# Patient Record
Sex: FEMALE | Race: WHITE | URBAN - NONMETROPOLITAN AREA
[De-identification: names, ages, dates, MRNs, and addresses within clinical notes are randomized per-mention and may not be internally consistent; named-entity substitution may affect disease eponyms.]

---

## 2022-01-20 ENCOUNTER — LAB OUTSIDE AN ENCOUNTER (OUTPATIENT)
Dept: URBAN - NONMETROPOLITAN AREA CLINIC 2 | Facility: CLINIC | Age: 52
End: 2022-01-20

## 2022-01-20 ENCOUNTER — WEB ENCOUNTER (OUTPATIENT)
Dept: URBAN - NONMETROPOLITAN AREA CLINIC 2 | Facility: CLINIC | Age: 52
End: 2022-01-20

## 2022-01-20 ENCOUNTER — OFFICE VISIT (OUTPATIENT)
Dept: URBAN - NONMETROPOLITAN AREA CLINIC 2 | Facility: CLINIC | Age: 52
End: 2022-01-20
Payer: COMMERCIAL

## 2022-01-20 VITALS
TEMPERATURE: 97.6 F | SYSTOLIC BLOOD PRESSURE: 152 MMHG | HEIGHT: 64 IN | DIASTOLIC BLOOD PRESSURE: 84 MMHG | WEIGHT: 228 LBS | HEART RATE: 88 BPM | BODY MASS INDEX: 38.93 KG/M2

## 2022-01-20 DIAGNOSIS — K59.01 SLOW TRANSIT CONSTIPATION: ICD-10-CM

## 2022-01-20 DIAGNOSIS — Z12.11 COLON CANCER SCREENING: ICD-10-CM

## 2022-01-20 DIAGNOSIS — K62.5 RECTAL BLEEDING: ICD-10-CM

## 2022-01-20 DIAGNOSIS — R10.31 RLQ ABDOMINAL PAIN: ICD-10-CM

## 2022-01-20 PROCEDURE — 99204 OFFICE O/P NEW MOD 45 MIN: CPT | Performed by: INTERNAL MEDICINE

## 2022-01-20 PROCEDURE — 99244 OFF/OP CNSLTJ NEW/EST MOD 40: CPT | Performed by: INTERNAL MEDICINE

## 2022-01-20 RX ORDER — ESOMEPRAZOLE MAGNESIUM 20 MG/1
1 PACKET MIXED WITH 15 ML OF WATER CAPSULE, DELAYED RELEASE ORAL ONCE A DAY
Status: ACTIVE | COMMUNITY

## 2022-01-20 RX ORDER — SODIUM PICOSULFATE, MAGNESIUM OXIDE, AND ANHYDROUS CITRIC ACID 10; 3.5; 12 MG/160ML; G/160ML; G/160ML
160 ML LIQUID ORAL
Qty: 320 MILLILITER | Refills: 0 | OUTPATIENT
Start: 2022-01-20 | End: 2022-01-21

## 2022-01-20 RX ORDER — SALICYLIC ACID 3 G/100G
1 TABLET SWALLOW WHOLE WITH WATER; DO NOT TAKE WITH FRUIT JUICES LOTION TOPICAL ONCE A DAY
Status: ACTIVE | COMMUNITY

## 2022-01-20 RX ORDER — SERTRALINE HYDROCHLORIDE 25 MG/1
1 TABLET TABLET, FILM COATED ORAL ONCE A DAY
Status: ACTIVE | COMMUNITY

## 2022-01-20 RX ORDER — ESZOPICLONE 2 MG/1
1 TABLET IMMEDIATELY BEFORE BEDTIME TABLET, COATED ORAL ONCE A DAY
Status: ACTIVE | COMMUNITY

## 2022-01-20 RX ORDER — LEVOTHYROXINE SODIUM 75 UG/1
1 TABLET IN THE MORNING ON AN EMPTY STOMACH TABLET ORAL ONCE A DAY
Status: ACTIVE | COMMUNITY

## 2022-01-20 NOTE — HPI-TODAY'S VISIT:
Nasra is a 51-year-old female referred to me by Dr. Rob Mims for consultation of colorectal cancer screening, constipation, rectal bleeding.  She states that she has had issues with constipation and incomplete evacuation for years.  She does not take anything regularly for her bowel movements.  She does have bright red blood per rectum at times with her hemorrhoids which she states she has had since childbirth.  In December she had significant anibal bleeding.  She states that time she has a bowel movement with every urination and at times she has to strain.  She recently was straining and had developed what felt like a right lower quadrant hernia.  She has never had a colonoscopy.  Her mother has a history of colon polyps, her maternal grandfather has a history of colon cancer.  Today we have discussed risk and benefit of colonoscopy, she agrees to pursue.  She agrees to start low-dose fiber and check labs.  MB

## 2022-01-22 LAB
A/G RATIO: 1.3
ALBUMIN: 3.5
ALKALINE PHOSPHATASE: 65
ALT (SGPT): 40
AST (SGOT): 31
BASO (ABSOLUTE): 0.1
BASOS: 1
BILIRUBIN, TOTAL: <0.2
BUN/CREATININE RATIO: 12
BUN: 11
C-REACTIVE PROTEIN, QUANT: 7
CALCIUM: 8.9
CARBON DIOXIDE, TOTAL: 22
CHLORIDE: 103
CREATININE: 0.92
DEAMIDATED GLIADIN ABS, IGA: 3
DEAMIDATED GLIADIN ABS, IGG: 2
EGFR IF AFRICN AM: 85
EGFR IF NONAFRICN AM: 73
ENDOMYSIAL ANTIBODY IGA: NEGATIVE
EOS (ABSOLUTE): 0.3
EOS: 3
GLOBULIN, TOTAL: 2.7
GLUCOSE: 86
HEMATOCRIT: 43.6
HEMATOLOGY COMMENTS:: (no result)
HEMOGLOBIN: 13.9
IMMATURE CELLS: (no result)
IMMATURE GRANS (ABS): 0.1
IMMATURE GRANULOCYTES: 1
IMMUNOGLOBULIN A, QN, SERUM: 143
LYMPHS (ABSOLUTE): 2.8
LYMPHS: 27
MCH: 28.4
MCHC: 31.9
MCV: 89
MONOCYTES(ABSOLUTE): 0.6
MONOCYTES: 6
NEUTROPHILS (ABSOLUTE): 6.7
NEUTROPHILS: 62
NRBC: (no result)
PLATELETS: 396
POTASSIUM: 4.1
PROTEIN, TOTAL: 6.2
RBC: 4.9
RDW: 12.3
SEDIMENTATION RATE-WESTERGREN: 27
SODIUM: 140
T-TRANSGLUTAMINASE (TTG) IGA: <2
T-TRANSGLUTAMINASE (TTG) IGG: <2
T4,FREE(DIRECT): 1.45
TSH: 0.77
WBC: 10.6

## 2022-01-24 ENCOUNTER — OFFICE VISIT (OUTPATIENT)
Dept: URBAN - NONMETROPOLITAN AREA SURGERY CENTER 1 | Facility: SURGERY CENTER | Age: 52
End: 2022-01-24
Payer: COMMERCIAL

## 2022-01-24 ENCOUNTER — CLAIMS CREATED FROM THE CLAIM WINDOW (OUTPATIENT)
Dept: URBAN - METROPOLITAN AREA CLINIC 4 | Facility: CLINIC | Age: 52
End: 2022-01-24
Payer: COMMERCIAL

## 2022-01-24 DIAGNOSIS — D12.2 BENIGN NEOPLASM OF ASCENDING COLON: ICD-10-CM

## 2022-01-24 DIAGNOSIS — D12.0 ADENOMA OF CECUM: ICD-10-CM

## 2022-01-24 DIAGNOSIS — K59.09 CHANGE IN BOWEL MOVEMENTS INTERMITTENT CONSTIPATION. URGENCY IN THE MORNING.: ICD-10-CM

## 2022-01-24 DIAGNOSIS — D12.2 ADENOMA OF ASCENDING COLON: ICD-10-CM

## 2022-01-24 DIAGNOSIS — D12.0 BENIGN NEOPLASM OF CECUM: ICD-10-CM

## 2022-01-24 DIAGNOSIS — R10.32 ABDOMINAL CRAMPING IN LEFT LOWER QUADRANT: ICD-10-CM

## 2022-01-24 PROCEDURE — G8907 PT DOC NO EVENTS ON DISCHARG: HCPCS | Performed by: INTERNAL MEDICINE

## 2022-01-24 PROCEDURE — 88305 TISSUE EXAM BY PATHOLOGIST: CPT | Performed by: PATHOLOGY

## 2022-01-24 PROCEDURE — 45385 COLONOSCOPY W/LESION REMOVAL: CPT | Performed by: INTERNAL MEDICINE

## 2022-01-25 ENCOUNTER — LAB OUTSIDE AN ENCOUNTER (OUTPATIENT)
Dept: URBAN - METROPOLITAN AREA CLINIC 92 | Facility: CLINIC | Age: 52
End: 2022-01-25

## 2022-01-25 ENCOUNTER — TELEPHONE ENCOUNTER (OUTPATIENT)
Dept: URBAN - METROPOLITAN AREA CLINIC 92 | Facility: CLINIC | Age: 52
End: 2022-01-25

## 2022-02-09 ENCOUNTER — TELEPHONE ENCOUNTER (OUTPATIENT)
Dept: URBAN - METROPOLITAN AREA CLINIC 92 | Facility: CLINIC | Age: 52
End: 2022-02-09

## 2022-02-21 LAB
A/G RATIO: 1.5
ACTIN (SMOOTH MUSCLE) ANTIBODY: 26
ALBUMIN: 3.6
ALKALINE PHOSPHATASE: 64
ALT (SGPT): 43
ANA DIRECT: NEGATIVE
AST (SGOT): 36
BASO (ABSOLUTE): 0.1
BASOS: 1
BILIRUBIN, TOTAL: 0.3
BUN/CREATININE RATIO: 13
BUN: 12
CALCIUM: 8.9
CARBON DIOXIDE, TOTAL: 23
CHLORIDE: 102
CREATININE: 0.92
EGFR IF AFRICN AM: 85
EGFR IF NONAFRICN AM: 73
EOS (ABSOLUTE): 0.3
EOS: 4
FERRITIN, SERUM: 54
GGT: 32
GLOBULIN, TOTAL: 2.4
GLUCOSE: 72
HBSAG SCREEN: NEGATIVE
HCV AB: <0.1
HEMATOCRIT: 42.9
HEMATOLOGY COMMENTS:: (no result)
HEMOGLOBIN: 13.6
HEP A AB, IGM: NEGATIVE
HEP B CORE AB, IGM: NEGATIVE
IMMATURE CELLS: (no result)
IMMATURE GRANS (ABS): 0.1
IMMATURE GRANULOCYTES: 1
INR: 0.9
INTERPRETATION:: (no result)
IRON BIND.CAP.(TIBC): 380
IRON SATURATION: 35
IRON: 134
LYMPHS (ABSOLUTE): 2.5
LYMPHS: 30
MCH: 28.8
MCHC: 31.7
MCV: 91
MITOCHONDRIAL (M2) ANTIBODY: <20
MONOCYTES(ABSOLUTE): 0.6
MONOCYTES: 7
NEUTROPHILS (ABSOLUTE): 4.8
NEUTROPHILS: 57
NRBC: (no result)
PLATELETS: 370
POTASSIUM: 4.4
PROTEIN, TOTAL: 6
PROTHROMBIN TIME: 9.7
RBC: 4.73
RDW: 12.6
SODIUM: 141
UIBC: 246
WBC: 8.4

## 2022-02-22 ENCOUNTER — TELEPHONE ENCOUNTER (OUTPATIENT)
Dept: URBAN - METROPOLITAN AREA CLINIC 92 | Facility: CLINIC | Age: 52
End: 2022-02-22

## 2022-02-28 ENCOUNTER — OFFICE VISIT (OUTPATIENT)
Dept: URBAN - METROPOLITAN AREA TELEHEALTH 2 | Facility: TELEHEALTH | Age: 52
End: 2022-02-28
Payer: COMMERCIAL

## 2022-02-28 DIAGNOSIS — R79.89 ELEVATED LFTS: ICD-10-CM

## 2022-02-28 DIAGNOSIS — K76.0 FATTY (CHANGE OF) LIVER: ICD-10-CM

## 2022-02-28 PROCEDURE — 97802 MEDICAL NUTRITION INDIV IN: CPT | Performed by: DIETITIAN, REGISTERED

## 2022-02-28 RX ORDER — ESOMEPRAZOLE MAGNESIUM 20 MG/1
1 PACKET MIXED WITH 15 ML OF WATER CAPSULE, DELAYED RELEASE ORAL ONCE A DAY
Status: ACTIVE | COMMUNITY

## 2022-02-28 RX ORDER — LEVOTHYROXINE SODIUM 75 UG/1
1 TABLET IN THE MORNING ON AN EMPTY STOMACH TABLET ORAL ONCE A DAY
Status: ACTIVE | COMMUNITY

## 2022-02-28 RX ORDER — SERTRALINE HYDROCHLORIDE 25 MG/1
1 TABLET TABLET, FILM COATED ORAL ONCE A DAY
Status: ACTIVE | COMMUNITY

## 2022-02-28 RX ORDER — SALICYLIC ACID 3 G/100G
1 TABLET SWALLOW WHOLE WITH WATER; DO NOT TAKE WITH FRUIT JUICES LOTION TOPICAL ONCE A DAY
Status: ACTIVE | COMMUNITY

## 2022-02-28 RX ORDER — ESZOPICLONE 2 MG/1
1 TABLET IMMEDIATELY BEFORE BEDTIME TABLET, COATED ORAL ONCE A DAY
Status: ACTIVE | COMMUNITY

## 2022-02-28 NOTE — HPI-TODAY'S VISIT:
Nutrition initial visit for fatty liver.  She reports that her weight fluctuates a lot.  She does not drink alcohol at all and never has.  Both of her parents had chronic health problems.  Her mother passed away last year due to complications from obesity and type 2 diabetes.  Patient is doing intermittent fasting.  2pm smoothie she makes at home.  She feels that if she eats intuitively she is craving healthier foods Eats dinner 6-7pm.

## 2022-04-26 ENCOUNTER — WEB ENCOUNTER (OUTPATIENT)
Dept: URBAN - NONMETROPOLITAN AREA CLINIC 2 | Facility: CLINIC | Age: 52
End: 2022-04-26

## 2022-04-26 ENCOUNTER — LAB OUTSIDE AN ENCOUNTER (OUTPATIENT)
Dept: URBAN - NONMETROPOLITAN AREA CLINIC 2 | Facility: CLINIC | Age: 52
End: 2022-04-26

## 2022-04-26 ENCOUNTER — OFFICE VISIT (OUTPATIENT)
Dept: URBAN - NONMETROPOLITAN AREA CLINIC 2 | Facility: CLINIC | Age: 52
End: 2022-04-26
Payer: COMMERCIAL

## 2022-04-26 VITALS
DIASTOLIC BLOOD PRESSURE: 82 MMHG | BODY MASS INDEX: 38.93 KG/M2 | TEMPERATURE: 97.5 F | SYSTOLIC BLOOD PRESSURE: 135 MMHG | WEIGHT: 228 LBS | HEIGHT: 64 IN | HEART RATE: 65 BPM

## 2022-04-26 DIAGNOSIS — R74.8 ELEVATED LIVER ENZYMES: ICD-10-CM

## 2022-04-26 DIAGNOSIS — Z83.71 FAMILY HISTORY OF COLONIC POLYPS: ICD-10-CM

## 2022-04-26 DIAGNOSIS — K59.01 SLOW TRANSIT CONSTIPATION: ICD-10-CM

## 2022-04-26 DIAGNOSIS — K62.5 RECTAL BLEEDING: ICD-10-CM

## 2022-04-26 DIAGNOSIS — R10.31 RLQ ABDOMINAL PAIN: ICD-10-CM

## 2022-04-26 DIAGNOSIS — Z80.0 FAMILY HISTORY OF COLON CANCER: ICD-10-CM

## 2022-04-26 DIAGNOSIS — K21.9 GASTROESOPHAGEAL REFLUX DISEASE, UNSPECIFIED WHETHER ESOPHAGITIS PRESENT: ICD-10-CM

## 2022-04-26 DIAGNOSIS — Z12.11 COLON CANCER SCREENING: ICD-10-CM

## 2022-04-26 PROCEDURE — 99214 OFFICE O/P EST MOD 30 MIN: CPT | Performed by: NURSE PRACTITIONER

## 2022-04-26 RX ORDER — LEVOTHYROXINE SODIUM 75 UG/1
1 TABLET IN THE MORNING ON AN EMPTY STOMACH TABLET ORAL ONCE A DAY
Status: ACTIVE | COMMUNITY

## 2022-04-26 RX ORDER — ESZOPICLONE 2 MG/1
1 TABLET IMMEDIATELY BEFORE BEDTIME TABLET, COATED ORAL ONCE A DAY
Status: ACTIVE | COMMUNITY

## 2022-04-26 RX ORDER — ESOMEPRAZOLE MAGNESIUM 40 MG/1
1 CAPSULE CAPSULE, DELAYED RELEASE ORAL ONCE A DAY
Qty: 90 CAPSULE | Refills: 3 | OUTPATIENT
Start: 2022-04-26

## 2022-04-26 RX ORDER — SALICYLIC ACID 3 G/100G
1 TABLET SWALLOW WHOLE WITH WATER; DO NOT TAKE WITH FRUIT JUICES LOTION TOPICAL ONCE A DAY
Status: ACTIVE | COMMUNITY

## 2022-04-26 RX ORDER — SERTRALINE HYDROCHLORIDE 25 MG/1
1 TABLET TABLET, FILM COATED ORAL ONCE A DAY
Status: ACTIVE | COMMUNITY

## 2022-04-26 RX ORDER — ESOMEPRAZOLE MAGNESIUM 20 MG/1
1 PACKET MIXED WITH 15 ML OF WATER CAPSULE, DELAYED RELEASE ORAL ONCE A DAY
Status: ACTIVE | COMMUNITY

## 2022-04-26 NOTE — HPI-TODAY'S VISIT:
Nasra is a 51-year-old female referred to me by Dr. Rob Mims for consultation of colorectal cancer screening, constipation, rectal bleeding.  She states that she has had issues with constipation and incomplete evacuation for years.  She does not take anything regularly for her bowel movements.  She does have bright red blood per rectum at times with her hemorrhoids which she states she has had since childbirth.  In December she had significant anibal bleeding.  She states that time she has a bowel movement with every urination and at times she has to strain.  She recently was straining and had developed what felt like a right lower quadrant hernia.  She has never had a colonoscopy.  Her mother has a history of colon polyps, her maternal grandfather has a history of colon cancer.  Today we have discussed risk and benefit of colonoscopy, she agrees to pursue.  She agrees to start low-dose fiber and check labs.  MB 4/26/2022 Nasra presents for follow-up of elevated liver enzymes, fatty liver, constipation and abdominal pain.  Since her last visit she started low-dose fiber.  Her bowels are more complete but she still has incomplete evacuation.  She does agree to start low-dose MiraLAX.  Her colonoscopy was normal other than 3 tubular adenomas, she will be due for repeat in 3 years.  She has been on Nexium to 20 mg capsules for years with reflux.  She is trying to wean to once daily but is usually unsuccessful after several days.  She is never had an upper endoscopy.  She denies any dysphagia.  She feels best on 40 mg of PPI daily.  Her ALT is still mildly elevated.  Her smooth muscle antibody is also weak positive.  She is due for repeat labs today with an IgG.  Her ultrasound shows fatty liver and mild  hepatomegaly.  Today we had a long discussion regarding weight loss.  She is struggled with this throughout her life.  We have discussed calorie counting, she would like to give this a try.  Today she is doing well otherwise with no new GI complaints.  She does agree to pursue EGD given her chronic reflux symptoms and inability to wean her PPI.  MB

## 2022-04-30 LAB
A/G RATIO: 1.5
ALBUMIN: 3.9
ALKALINE PHOSPHATASE: 69
ALT (SGPT): 31
AST (SGOT): 28
BASO (ABSOLUTE): 0.1
BASOS: 1
BILIRUBIN, TOTAL: 0.2
BUN/CREATININE RATIO: 16
BUN: 13
CALCIUM: 9.1
CARBON DIOXIDE, TOTAL: 23
CHLORIDE: 105
CREATININE: 0.8
EGFR: 90
EOS (ABSOLUTE): 0.3
EOS: 4
GLOBULIN, TOTAL: 2.6
GLUCOSE: 86
HEMATOCRIT: 43.1
HEMATOLOGY COMMENTS:: (no result)
HEMOGLOBIN: 13.7
IGG, IMMUNOGLOBULIN G (RDL): 924
IMMATURE CELLS: (no result)
IMMATURE GRANS (ABS): 0
IMMATURE GRANULOCYTES: 0
LYMPHS (ABSOLUTE): 2.3
LYMPHS: 31
MCH: 28.8
MCHC: 31.8
MCV: 91
MONOCYTES(ABSOLUTE): 0.5
MONOCYTES: 6
NEUTROPHILS (ABSOLUTE): 4.3
NEUTROPHILS: 58
NRBC: (no result)
PLATELETS: 386
POTASSIUM: 4.5
PROTEIN, TOTAL: 6.5
RBC: 4.75
RDW: 11.9
SODIUM: 140
WBC: 7.4

## 2022-05-26 ENCOUNTER — TELEPHONE ENCOUNTER (OUTPATIENT)
Dept: URBAN - NONMETROPOLITAN AREA CLINIC 13 | Facility: CLINIC | Age: 52
End: 2022-05-26

## 2022-05-26 RX ORDER — ESOMEPRAZOLE MAGNESIUM 40 MG/1
1 CAPSULE CAPSULE, DELAYED RELEASE ORAL ONCE A DAY
Qty: 90 CAPSULE | Refills: 3
Start: 2022-04-26

## 2022-06-21 ENCOUNTER — OFFICE VISIT (OUTPATIENT)
Dept: URBAN - NONMETROPOLITAN AREA SURGERY CENTER 1 | Facility: SURGERY CENTER | Age: 52
End: 2022-06-21
Payer: COMMERCIAL

## 2022-06-21 ENCOUNTER — CLAIMS CREATED FROM THE CLAIM WINDOW (OUTPATIENT)
Dept: URBAN - METROPOLITAN AREA CLINIC 4 | Facility: CLINIC | Age: 52
End: 2022-06-21
Payer: COMMERCIAL

## 2022-06-21 DIAGNOSIS — K21.9 ACID REFLUX: ICD-10-CM

## 2022-06-21 DIAGNOSIS — K29.70 GASTRITIS, UNSPECIFIED, WITHOUT BLEEDING: ICD-10-CM

## 2022-06-21 DIAGNOSIS — K31.89 ACQUIRED DEFORMITY OF DUODENUM: ICD-10-CM

## 2022-06-21 DIAGNOSIS — K31.89 OTHER DISEASES OF STOMACH AND DUODENUM: ICD-10-CM

## 2022-06-21 DIAGNOSIS — K22.2 ACQUIRED ESOPHAGEAL RING: ICD-10-CM

## 2022-06-21 DIAGNOSIS — K21.9 GASTRO-ESOPHAGEAL REFLUX DISEASE WITHOUT ESOPHAGITIS: ICD-10-CM

## 2022-06-21 PROCEDURE — 43249 ESOPH EGD DILATION <30 MM: CPT | Performed by: INTERNAL MEDICINE

## 2022-06-21 PROCEDURE — 43239 EGD BIOPSY SINGLE/MULTIPLE: CPT | Performed by: INTERNAL MEDICINE

## 2022-06-21 PROCEDURE — 88312 SPECIAL STAINS GROUP 1: CPT | Performed by: PATHOLOGY

## 2022-06-21 PROCEDURE — G8907 PT DOC NO EVENTS ON DISCHARG: HCPCS | Performed by: INTERNAL MEDICINE

## 2022-06-21 PROCEDURE — 88305 TISSUE EXAM BY PATHOLOGIST: CPT | Performed by: PATHOLOGY

## 2022-07-20 ENCOUNTER — OFFICE VISIT (OUTPATIENT)
Dept: URBAN - METROPOLITAN AREA TELEHEALTH 2 | Facility: TELEHEALTH | Age: 52
End: 2022-07-20
Payer: COMMERCIAL

## 2022-07-20 DIAGNOSIS — Z83.71 FAMILY HISTORY OF COLONIC POLYPS: ICD-10-CM

## 2022-07-20 DIAGNOSIS — R10.31 RLQ ABDOMINAL PAIN: ICD-10-CM

## 2022-07-20 DIAGNOSIS — K59.01 SLOW TRANSIT CONSTIPATION: ICD-10-CM

## 2022-07-20 DIAGNOSIS — R74.8 ELEVATED LIVER ENZYMES: ICD-10-CM

## 2022-07-20 DIAGNOSIS — Z12.11 COLON CANCER SCREENING: ICD-10-CM

## 2022-07-20 DIAGNOSIS — K21.9 GASTROESOPHAGEAL REFLUX DISEASE, UNSPECIFIED WHETHER ESOPHAGITIS PRESENT: ICD-10-CM

## 2022-07-20 DIAGNOSIS — K62.5 RECTAL BLEEDING: ICD-10-CM

## 2022-07-20 DIAGNOSIS — Z80.0 FAMILY HISTORY OF COLON CANCER: ICD-10-CM

## 2022-07-20 PROCEDURE — 99214 OFFICE O/P EST MOD 30 MIN: CPT | Performed by: INTERNAL MEDICINE

## 2022-07-20 RX ORDER — ESZOPICLONE 2 MG/1
1 TABLET IMMEDIATELY BEFORE BEDTIME TABLET, COATED ORAL ONCE A DAY
Status: ACTIVE | COMMUNITY

## 2022-07-20 RX ORDER — SERTRALINE HYDROCHLORIDE 25 MG/1
1 TABLET TABLET, FILM COATED ORAL ONCE A DAY
Status: ACTIVE | COMMUNITY

## 2022-07-20 RX ORDER — ESOMEPRAZOLE MAGNESIUM 20 MG/1
1 PACKET MIXED WITH 15 ML OF WATER CAPSULE, DELAYED RELEASE ORAL ONCE A DAY
Status: ACTIVE | COMMUNITY

## 2022-07-20 RX ORDER — ESOMEPRAZOLE MAGNESIUM 40 MG/1
1 CAPSULE CAPSULE, DELAYED RELEASE ORAL ONCE A DAY
Qty: 90 CAPSULE | Refills: 3 | Status: ACTIVE | COMMUNITY
Start: 2022-04-26

## 2022-07-20 RX ORDER — LEVOTHYROXINE SODIUM 75 UG/1
1 TABLET IN THE MORNING ON AN EMPTY STOMACH TABLET ORAL ONCE A DAY
Status: ACTIVE | COMMUNITY

## 2022-07-20 RX ORDER — SALICYLIC ACID 3 G/100G
1 TABLET SWALLOW WHOLE WITH WATER; DO NOT TAKE WITH FRUIT JUICES LOTION TOPICAL ONCE A DAY
Status: ACTIVE | COMMUNITY

## 2022-07-20 NOTE — HPI-TODAY'S VISIT:
Nasra is a 51-year-old female referred to me by Dr. Rob Mims for consultation of colorectal cancer screening, constipation, rectal bleeding.  She states that she has had issues with constipation and incomplete evacuation for years.  She does not take anything regularly for her bowel movements.  She does have bright red blood per rectum at times with her hemorrhoids which she states she has had since childbirth.  In December she had significant anibal bleeding.  She states that time she has a bowel movement with every urination and at times she has to strain.  She recently was straining and had developed what felt like a right lower quadrant hernia.  She has never had a colonoscopy.  Her mother has a history of colon polyps, her maternal grandfather has a history of colon cancer.  Today we have discussed risk and benefit of colonoscopy, she agrees to pursue.  She agrees to start low-dose fiber and check labs.  MB 4/26/2022 Nasra presents for follow-up of elevated liver enzymes, fatty liver, constipation and abdominal pain.  Since her last visit she started low-dose fiber.  Her bowels are more complete but she still has incomplete evacuation.  She does agree to start low-dose MiraLAX.  Her colonoscopy was normal other than 3 tubular adenomas, she will be due for repeat in 3 years.  She has been on Nexium to 20 mg capsules for years with reflux.  She is trying to wean to once daily but is usually unsuccessful after several days.  She is never had an upper endoscopy.  She denies any dysphagia.  She feels best on 40 mg of PPI daily.  Her ALT is still mildly elevated.  Her smooth muscle antibody is also weak positive.  She is due for repeat labs today with an IgG.  Her ultrasound shows fatty liver and mild  hepatomegaly.  Today we had a long discussion regarding weight loss.  She is struggled with this throughout her life.  We have discussed calorie counting, she would like to give this a try.  Today she is doing well otherwise with no new GI complaints.  She does agree to pursue EGD given her chronic reflux symptoms and inability to wean her PPI.  MB  7/20/2022 Nasra presents for follow up of fatty liver and constipation. Since her last visit she has increased her water intake substantially. She has noticed increased constipation. She is taking miralax and metamucil. She states when she changed her diet she noticed increased constipation with alternating diarrhea. She took away metamucil her stools are very sticky. When she adds in the miralax regularly she has runny stools. She has not tried just fiber itself. She has been intentional with eating whole foods and drinking lots of water. Her ALT is down to 40 from 43 in february. Her Cholesterol is significantly elevated. She is working on her diet but she suspects she will need a statin. Her EGD reveals mild reflux and gastritis. She is doing well on nexium 40mg daily. She wants to keep working on weight loss and then discus weaning at f/u visit. MB  This telehealth visit was provided at the patient's home.

## 2023-01-12 LAB
A/G RATIO: 1.2
ABSOLUTE BASOPHILS: 80
ABSOLUTE EOSINOPHILS: 320
ABSOLUTE LYMPHOCYTES: 2810
ABSOLUTE MONOCYTES: 670
ABSOLUTE NEUTROPHILS: 6120
ALBUMIN: 3.3
ALKALINE PHOSPHATASE: 64
ALT (SGPT): 27
AST (SGOT): 25
BASOPHILS: 0.8
BILIRUBIN, TOTAL: 0.3
BUN/CREATININE RATIO: (no result)
BUN: 12
CALCIUM: 8.8
CARBON DIOXIDE, TOTAL: 28
CHLORIDE: 107
CREATININE: 0.91
EGFR: 77
EOSINOPHILS: 3.2
GLOBULIN, TOTAL: 2.7
GLUCOSE: 75
HEMATOCRIT: 42
HEMOGLOBIN: 13.7
LYMPHOCYTES: 28.1
MCH: 28.6
MCHC: 32.6
MCV: 87.7
MONOCYTES: 6.7
MPV: 9.8
NEUTROPHILS: 61.2
PLATELET COUNT: 366
POTASSIUM: 4.7
PROTEIN, TOTAL: 6
RDW: 12.3
RED BLOOD CELL COUNT: 4.79
SODIUM: 141
WHITE BLOOD CELL COUNT: 10

## 2023-01-19 ENCOUNTER — OFFICE VISIT (OUTPATIENT)
Dept: URBAN - NONMETROPOLITAN AREA CLINIC 2 | Facility: CLINIC | Age: 53
End: 2023-01-19
Payer: COMMERCIAL

## 2023-01-19 ENCOUNTER — LAB OUTSIDE AN ENCOUNTER (OUTPATIENT)
Dept: URBAN - NONMETROPOLITAN AREA CLINIC 2 | Facility: CLINIC | Age: 53
End: 2023-01-19

## 2023-01-19 VITALS
BODY MASS INDEX: 37.9 KG/M2 | DIASTOLIC BLOOD PRESSURE: 85 MMHG | TEMPERATURE: 97.5 F | HEART RATE: 98 BPM | WEIGHT: 222 LBS | HEIGHT: 64 IN | SYSTOLIC BLOOD PRESSURE: 150 MMHG

## 2023-01-19 DIAGNOSIS — K59.01 SLOW TRANSIT CONSTIPATION: ICD-10-CM

## 2023-01-19 DIAGNOSIS — Z12.11 COLON CANCER SCREENING: ICD-10-CM

## 2023-01-19 DIAGNOSIS — R74.8 ELEVATED LIVER ENZYMES: ICD-10-CM

## 2023-01-19 DIAGNOSIS — R10.31 RLQ ABDOMINAL PAIN: ICD-10-CM

## 2023-01-19 DIAGNOSIS — K21.9 GASTROESOPHAGEAL REFLUX DISEASE, UNSPECIFIED WHETHER ESOPHAGITIS PRESENT: ICD-10-CM

## 2023-01-19 DIAGNOSIS — Z80.0 FAMILY HISTORY OF COLON CANCER: ICD-10-CM

## 2023-01-19 DIAGNOSIS — K62.5 RECTAL BLEEDING: ICD-10-CM

## 2023-01-19 DIAGNOSIS — Z83.71 FAMILY HISTORY OF COLONIC POLYPS: ICD-10-CM

## 2023-01-19 PROBLEM — 312824007: Status: ACTIVE | Noted: 2022-01-20

## 2023-01-19 PROBLEM — 305058001: Status: ACTIVE | Noted: 2022-01-20

## 2023-01-19 PROBLEM — 12063002: Status: ACTIVE | Noted: 2022-01-20

## 2023-01-19 PROBLEM — 301754002: Status: ACTIVE | Noted: 2022-01-20

## 2023-01-19 PROBLEM — 429969003: Status: ACTIVE | Noted: 2022-01-20

## 2023-01-19 PROCEDURE — 99214 OFFICE O/P EST MOD 30 MIN: CPT | Performed by: NURSE PRACTITIONER

## 2023-01-19 RX ORDER — ESOMEPRAZOLE MAGNESIUM 40 MG/1
1 CAPSULE CAPSULE, DELAYED RELEASE ORAL ONCE A DAY
Qty: 90 CAPSULE | Refills: 3 | Status: ACTIVE | COMMUNITY
Start: 2022-04-26

## 2023-01-19 RX ORDER — SERTRALINE HYDROCHLORIDE 25 MG/1
1 TABLET TABLET, FILM COATED ORAL ONCE A DAY
Status: ACTIVE | COMMUNITY

## 2023-01-19 RX ORDER — LEVOTHYROXINE SODIUM 75 UG/1
1 TABLET IN THE MORNING ON AN EMPTY STOMACH TABLET ORAL ONCE A DAY
Status: ACTIVE | COMMUNITY

## 2023-01-19 RX ORDER — SALICYLIC ACID 3 G/100G
1 TABLET SWALLOW WHOLE WITH WATER; DO NOT TAKE WITH FRUIT JUICES LOTION TOPICAL ONCE A DAY
Status: ACTIVE | COMMUNITY

## 2023-01-19 RX ORDER — ESZOPICLONE 2 MG/1
1 TABLET IMMEDIATELY BEFORE BEDTIME TABLET, COATED ORAL ONCE A DAY
Status: ACTIVE | COMMUNITY

## 2023-01-19 RX ORDER — ESOMEPRAZOLE MAGNESIUM 20 MG/1
1 PACKET MIXED WITH 15 ML OF WATER CAPSULE, DELAYED RELEASE ORAL ONCE A DAY
Status: ACTIVE | COMMUNITY

## 2023-01-19 NOTE — HPI-TODAY'S VISIT:
Nasra is a 51-year-old female referred to me by Dr. Rob Mims for consultation of colorectal cancer screening, constipation, rectal bleeding.  She states that she has had issues with constipation and incomplete evacuation for years.  She does not take anything regularly for her bowel movements.  She does have bright red blood per rectum at times with her hemorrhoids which she states she has had since childbirth.  In December she had significant anibal bleeding.  She states that time she has a bowel movement with every urination and at times she has to strain.  She recently was straining and had developed what felt like a right lower quadrant hernia.  She has never had a colonoscopy.  Her mother has a history of colon polyps, her maternal grandfather has a history of colon cancer.  Today we have discussed risk and benefit of colonoscopy, she agrees to pursue.  She agrees to start low-dose fiber and check labs.  MB 4/26/2022 Nasra presents for follow-up of elevated liver enzymes, fatty liver, constipation and abdominal pain.  Since her last visit she started low-dose fiber.  Her bowels are more complete but she still has incomplete evacuation.  She does agree to start low-dose MiraLAX.  Her colonoscopy was normal other than 3 tubular adenomas, she will be due for repeat in 3 years.  She has been on Nexium to 20 mg capsules for years with reflux.  She is trying to wean to once daily but is usually unsuccessful after several days.  She is never had an upper endoscopy.  She denies any dysphagia.  She feels best on 40 mg of PPI daily.  Her ALT is still mildly elevated.  Her smooth muscle antibody is also weak positive.  She is due for repeat labs today with an IgG.  Her ultrasound shows fatty liver and mild  hepatomegaly.  Today we had a long discussion regarding weight loss.  She is struggled with this throughout her life.  We have discussed calorie counting, she would like to give this a try.  Today she is doing well otherwise with no new GI complaints.  She does agree to pursue EGD given her chronic reflux symptoms and inability to wean her PPI.  MB  7/20/2022 Nasra presents for follow up of fatty liver and constipation. Since her last visit she has increased her water intake substantially. She has noticed increased constipation. She is taking miralax and metamucil. She states when she changed her diet she noticed increased constipation with alternating diarrhea. She took away metamucil her stools are very sticky. When she adds in the miralax regularly she has runny stools. She has not tried just fiber itself. She has been intentional with eating whole foods and drinking lots of water. Her ALT is down to 40 from 43 in february. Her Cholesterol is significantly elevated. She is working on her diet but she suspects she will need a statin. Her EGD reveals mild reflux and gastritis. She is doing well on nexium 40mg daily. She wants to keep working on weight loss and then discus weaning at f/u visit. MB  This telehealth visit was provided at the patient's home. 1/19/2023 Nasra presents for follow-up of reflux and fatty liver.  Since her last visit she is doing well and is omeprazole 40 mg daily.  She does not want to wean at this time.  She is working on weight loss and down 8 pounds.  She is reading a book called the ENT diet and this is helping her.  She is trying to move her body more and consume less calories overall without counting.  Today she denies any new GI complaints.  MB

## 2023-01-20 ENCOUNTER — LAB OUTSIDE AN ENCOUNTER (OUTPATIENT)
Dept: URBAN - METROPOLITAN AREA TELEHEALTH 2 | Facility: TELEHEALTH | Age: 53
End: 2023-01-20

## 2023-02-14 ENCOUNTER — TELEPHONE ENCOUNTER (OUTPATIENT)
Dept: URBAN - METROPOLITAN AREA CLINIC 92 | Facility: CLINIC | Age: 53
End: 2023-02-14

## 2023-06-06 ENCOUNTER — TELEPHONE ENCOUNTER (OUTPATIENT)
Dept: URBAN - NONMETROPOLITAN AREA CLINIC 2 | Facility: CLINIC | Age: 53
End: 2023-06-06

## 2023-06-06 RX ORDER — ESOMEPRAZOLE MAGNESIUM 40 MG/1
1 CAPSULE CAPSULE, DELAYED RELEASE ORAL ONCE A DAY
Qty: 90 CAPSULE | Refills: 3
Start: 2022-04-26

## 2023-07-19 ENCOUNTER — LAB OUTSIDE AN ENCOUNTER (OUTPATIENT)
Dept: URBAN - NONMETROPOLITAN AREA CLINIC 2 | Facility: CLINIC | Age: 53
End: 2023-07-19

## 2023-07-20 ENCOUNTER — OFFICE VISIT (OUTPATIENT)
Dept: URBAN - NONMETROPOLITAN AREA CLINIC 2 | Facility: CLINIC | Age: 53
End: 2023-07-20
Payer: COMMERCIAL

## 2023-07-20 ENCOUNTER — LAB OUTSIDE AN ENCOUNTER (OUTPATIENT)
Dept: URBAN - NONMETROPOLITAN AREA CLINIC 2 | Facility: CLINIC | Age: 53
End: 2023-07-20

## 2023-07-20 VITALS
DIASTOLIC BLOOD PRESSURE: 80 MMHG | SYSTOLIC BLOOD PRESSURE: 125 MMHG | BODY MASS INDEX: 40.63 KG/M2 | TEMPERATURE: 98.6 F | HEART RATE: 82 BPM | HEIGHT: 64 IN | WEIGHT: 238 LBS

## 2023-07-20 DIAGNOSIS — Z12.11 COLON CANCER SCREENING: ICD-10-CM

## 2023-07-20 DIAGNOSIS — K62.5 RECTAL BLEEDING: ICD-10-CM

## 2023-07-20 DIAGNOSIS — R74.8 ELEVATED LIVER ENZYMES: ICD-10-CM

## 2023-07-20 DIAGNOSIS — Z83.71 FAMILY HISTORY OF COLONIC POLYPS: ICD-10-CM

## 2023-07-20 DIAGNOSIS — Z80.0 FAMILY HISTORY OF COLON CANCER: ICD-10-CM

## 2023-07-20 DIAGNOSIS — K58.2 IRRITABLE BOWEL SYNDROME WITH BOTH CONSTIPATION AND DIARRHEA: ICD-10-CM

## 2023-07-20 DIAGNOSIS — R10.31 RLQ ABDOMINAL PAIN: ICD-10-CM

## 2023-07-20 DIAGNOSIS — K21.9 GASTROESOPHAGEAL REFLUX DISEASE, UNSPECIFIED WHETHER ESOPHAGITIS PRESENT: ICD-10-CM

## 2023-07-20 PROCEDURE — 99214 OFFICE O/P EST MOD 30 MIN: CPT | Performed by: NURSE PRACTITIONER

## 2023-07-20 RX ORDER — ESZOPICLONE 2 MG/1
1 TABLET IMMEDIATELY BEFORE BEDTIME TABLET, COATED ORAL ONCE A DAY
Status: ACTIVE | COMMUNITY

## 2023-07-20 RX ORDER — ESOMEPRAZOLE MAGNESIUM 20 MG/1
1 PACKET MIXED WITH 15 ML OF WATER CAPSULE, DELAYED RELEASE ORAL ONCE A DAY
Status: ACTIVE | COMMUNITY

## 2023-07-20 RX ORDER — LEVOTHYROXINE SODIUM 75 UG/1
1 TABLET IN THE MORNING ON AN EMPTY STOMACH TABLET ORAL ONCE A DAY
Status: ACTIVE | COMMUNITY

## 2023-07-20 RX ORDER — SERTRALINE HYDROCHLORIDE 25 MG/1
1 TABLET TABLET, FILM COATED ORAL ONCE A DAY
Status: ON HOLD | COMMUNITY

## 2023-07-20 RX ORDER — ESOMEPRAZOLE MAGNESIUM 40 MG/1
1 CAPSULE CAPSULE, DELAYED RELEASE ORAL ONCE A DAY
Qty: 90 CAPSULE | Refills: 3 | Status: ACTIVE | COMMUNITY
Start: 2022-04-26

## 2023-07-20 RX ORDER — RIFAXIMIN 550 MG/1
1 TABLET TABLET ORAL THREE TIMES A DAY
Qty: 42 TABLET | Refills: 1 | OUTPATIENT
Start: 2023-07-20 | End: 2023-08-17

## 2023-07-20 RX ORDER — SALICYLIC ACID 3 G/100G
1 TABLET SWALLOW WHOLE WITH WATER; DO NOT TAKE WITH FRUIT JUICES LOTION TOPICAL ONCE A DAY
Status: ACTIVE | COMMUNITY

## 2023-07-20 NOTE — HPI-TODAY'S VISIT:
Nasra is a 51-year-old female referred to me by Dr. Rob Mims for consultation of colorectal cancer screening, constipation, rectal bleeding.  She states that she has had issues with constipation and incomplete evacuation for years.  She does not take anything regularly for her bowel movements.  She does have bright red blood per rectum at times with her hemorrhoids which she states she has had since childbirth.  In December she had significant anibal bleeding.  She states that time she has a bowel movement with every urination and at times she has to strain.  She recently was straining and had developed what felt like a right lower quadrant hernia.  She has never had a colonoscopy.  Her mother has a history of colon polyps, her maternal grandfather has a history of colon cancer.  Today we have discussed risk and benefit of colonoscopy, she agrees to pursue.  She agrees to start low-dose fiber and check labs.  MB 4/26/2022 Nasra presents for follow-up of elevated liver enzymes, fatty liver, constipation and abdominal pain.  Since her last visit she started low-dose fiber.  Her bowels are more complete but she still has incomplete evacuation.  She does agree to start low-dose MiraLAX.  Her colonoscopy was normal other than 3 tubular adenomas, she will be due for repeat in 3 years.  She has been on Nexium to 20 mg capsules for years with reflux.  She is trying to wean to once daily but is usually unsuccessful after several days.  She is never had an upper endoscopy.  She denies any dysphagia.  She feels best on 40 mg of PPI daily.  Her ALT is still mildly elevated.  Her smooth muscle antibody is also weak positive.  She is due for repeat labs today with an IgG.  Her ultrasound shows fatty liver and mild  hepatomegaly.  Today we had a long discussion regarding weight loss.  She is struggled with this throughout her life.  We have discussed calorie counting, she would like to give this a try.  Today she is doing well otherwise with no new GI complaints.  She does agree to pursue EGD given her chronic reflux symptoms and inability to wean her PPI.  MB  7/20/2022 Nasra presents for follow up of fatty liver and constipation. Since her last visit she has increased her water intake substantially. She has noticed increased constipation. She is taking miralax and metamucil. She states when she changed her diet she noticed increased constipation with alternating diarrhea. She took away metamucil her stools are very sticky. When she adds in the miralax regularly she has runny stools. She has not tried just fiber itself. She has been intentional with eating whole foods and drinking lots of water. Her ALT is down to 40 from 43 in february. Her Cholesterol is significantly elevated. She is working on her diet but she suspects she will need a statin. Her EGD reveals mild reflux and gastritis. She is doing well on nexium 40mg daily. She wants to keep working on weight loss and then discus weaning at f/u visit. MB  This telehealth visit was provided at the patient's home. 1/19/2023 Nasra presents for follow-up of reflux and fatty liver.  Since her last visit she is doing well and is omeprazole 40 mg daily.  She does not want to wean at this time.  She is working on weight loss and down 8 pounds.  She is reading a book called the ENT diet and this is helping her.  She is trying to move her body more and consume less calories overall without counting.  Today she denies any new GI complaints.  MB 7/20/2023 Nasra presents for follow-up of reflux, fatty liver, and irritable bowel syndrome.  Since her last visit she has had a back injury and has not been able to work out as often.  She has gained a large amount of weight quickly almost 20 pounds.  She has had increased loose urgent bowel movements.  She denies any rectal bleeding or nocturnal symptoms.  Her right lower quadrant abdominal pain has improved.  She does have a history of fatty liver.  Today her main complaint is change in bowel habits.  Her colonoscopy in 2022 reveals 3 TA's.  We will check labs and schedule for stool studies.  If this is negative proceed with a course of Xifaxan for IBS-D.  She does ask about semaglutide products for weight loss, I recommended she discuss that with her primary care physician.  Her reflux is stable on Nexium 40 mg daily, we will hold off on weaning given her weight gain.  She is due for repeat labs, I suspect these will be mildly elevated with weight gain.  MB

## 2023-07-21 ENCOUNTER — TELEPHONE ENCOUNTER (OUTPATIENT)
Dept: URBAN - NONMETROPOLITAN AREA CLINIC 13 | Facility: CLINIC | Age: 53
End: 2023-07-21

## 2023-07-21 LAB
A/G RATIO: 1.2
ABSOLUTE BASOPHILS: 71
ABSOLUTE EOSINOPHILS: 383
ABSOLUTE LYMPHOCYTES: 2973
ABSOLUTE MONOCYTES: 472
ABSOLUTE NEUTROPHILS: 5002
ALBUMIN: 3.4
ALKALINE PHOSPHATASE: 75
ALT (SGPT): 37
AST (SGOT): 29
BASOPHILS: 0.8
BILIRUBIN, TOTAL: 0.2
BUN/CREATININE RATIO: (no result)
BUN: 14
C-REACTIVE PROTEIN, QUANT: 5.8
CALCIUM: 8.7
CARBON DIOXIDE, TOTAL: 26
CHLORIDE: 106
CREATININE: 0.88
EGFR: 80
EOSINOPHILS: 4.3
GLOBULIN, TOTAL: 2.8
GLUCOSE: 83
HEMATOCRIT: 41.7
HEMOGLOBIN: 13.7
LYMPHOCYTES: 33.4
MCH: 28.6
MCHC: 32.9
MCV: 87.1
MONOCYTES: 5.3
MPV: 9.3
NEUTROPHILS: 56.2
PLATELET COUNT: 372
POTASSIUM: 4.2
PROTEIN, TOTAL: 6.2
RDW: 12.8
RED BLOOD CELL COUNT: 4.79
SED RATE BY MODIFIED: 9
SODIUM: 140
TSH: 3.71
WHITE BLOOD CELL COUNT: 8.9

## 2023-07-26 ENCOUNTER — TELEPHONE ENCOUNTER (OUTPATIENT)
Dept: URBAN - NONMETROPOLITAN AREA CLINIC 2 | Facility: CLINIC | Age: 53
End: 2023-07-26

## 2023-07-27 ENCOUNTER — TELEPHONE ENCOUNTER (OUTPATIENT)
Dept: URBAN - NONMETROPOLITAN AREA CLINIC 13 | Facility: CLINIC | Age: 53
End: 2023-07-27

## 2023-08-01 LAB
CAMPYLOBACTER SPP. AG,EIA: (no result)
CLOSTRIDIUM DIFFICILE: (no result)
GIARDIA AG, EIA, STOOL: (no result)
LEUKOCYTES: (no result)
OVA AND PARASITES, CONC AND PERM SMEAR: (no result)
SALMONELLA AND SHIGELLA, CULTURE: (no result)
SHIGA TOXINS, EIA W/RFL TO E.COLI O157 CULTURE: (no result)

## 2023-08-02 ENCOUNTER — TELEPHONE ENCOUNTER (OUTPATIENT)
Dept: URBAN - NONMETROPOLITAN AREA CLINIC 2 | Facility: CLINIC | Age: 53
End: 2023-08-02

## 2023-08-21 ENCOUNTER — WEB ENCOUNTER (OUTPATIENT)
Dept: URBAN - NONMETROPOLITAN AREA CLINIC 2 | Facility: CLINIC | Age: 53
End: 2023-08-21

## 2023-11-01 ENCOUNTER — WEB ENCOUNTER (OUTPATIENT)
Dept: URBAN - NONMETROPOLITAN AREA CLINIC 2 | Facility: CLINIC | Age: 53
End: 2023-11-01

## 2023-11-01 RX ORDER — ESOMEPRAZOLE MAGNESIUM 40 MG/1
1 CAPSULE CAPSULE, DELAYED RELEASE ORAL ONCE A DAY
Qty: 90 CAPSULE | Refills: 3
Start: 2022-04-26

## 2024-01-11 ENCOUNTER — LAB OUTSIDE AN ENCOUNTER (OUTPATIENT)
Dept: URBAN - NONMETROPOLITAN AREA CLINIC 2 | Facility: CLINIC | Age: 54
End: 2024-01-11

## 2024-01-11 ENCOUNTER — OFFICE VISIT (OUTPATIENT)
Dept: URBAN - NONMETROPOLITAN AREA CLINIC 2 | Facility: CLINIC | Age: 54
End: 2024-01-11
Payer: COMMERCIAL

## 2024-01-11 ENCOUNTER — DASHBOARD ENCOUNTERS (OUTPATIENT)
Age: 54
End: 2024-01-11

## 2024-01-11 VITALS
BODY MASS INDEX: 37.9 KG/M2 | HEART RATE: 70 BPM | WEIGHT: 222 LBS | TEMPERATURE: 98 F | HEIGHT: 64 IN | DIASTOLIC BLOOD PRESSURE: 87 MMHG | SYSTOLIC BLOOD PRESSURE: 142 MMHG

## 2024-01-11 DIAGNOSIS — K62.5 RECTAL BLEEDING: ICD-10-CM

## 2024-01-11 DIAGNOSIS — K58.2 IRRITABLE BOWEL SYNDROME WITH BOTH CONSTIPATION AND DIARRHEA: ICD-10-CM

## 2024-01-11 DIAGNOSIS — K76.0 FATTY LIVER: ICD-10-CM

## 2024-01-11 DIAGNOSIS — K21.9 ACID REFLUX: ICD-10-CM

## 2024-01-11 PROBLEM — 707724006: Status: ACTIVE | Noted: 2022-01-25

## 2024-01-11 PROBLEM — 197321007: Status: ACTIVE | Noted: 2024-01-11

## 2024-01-11 PROBLEM — 235595009: Status: ACTIVE | Noted: 2022-04-26

## 2024-01-11 PROBLEM — 10743008: Status: ACTIVE | Noted: 2023-07-20

## 2024-01-11 PROCEDURE — 99214 OFFICE O/P EST MOD 30 MIN: CPT | Performed by: NURSE PRACTITIONER

## 2024-01-11 RX ORDER — LEVOTHYROXINE SODIUM 75 UG/1
1 TABLET IN THE MORNING ON AN EMPTY STOMACH TABLET ORAL ONCE A DAY
Status: ACTIVE | COMMUNITY

## 2024-01-11 RX ORDER — SALICYLIC ACID 3 G/100G
1 TABLET SWALLOW WHOLE WITH WATER; DO NOT TAKE WITH FRUIT JUICES LOTION TOPICAL ONCE A DAY
Status: ACTIVE | COMMUNITY

## 2024-01-11 RX ORDER — ESOMEPRAZOLE MAGNESIUM 40 MG/1
1 CAPSULE CAPSULE, DELAYED RELEASE ORAL ONCE A DAY
Qty: 90 CAPSULE | Refills: 3 | Status: ACTIVE | COMMUNITY
Start: 2022-04-26

## 2024-01-11 RX ORDER — ESZOPICLONE 2 MG/1
1 TABLET IMMEDIATELY BEFORE BEDTIME TABLET, COATED ORAL ONCE A DAY
Status: ACTIVE | COMMUNITY

## 2024-01-11 RX ORDER — FAMOTIDINE 40 MG/1
1 TABLET TABLET, FILM COATED ORAL ONCE A DAY
Qty: 90 TABLET | Refills: 3 | OUTPATIENT
Start: 2024-01-11

## 2024-01-11 RX ORDER — SERTRALINE HYDROCHLORIDE 25 MG/1
1 TABLET TABLET, FILM COATED ORAL ONCE A DAY
Status: ON HOLD | COMMUNITY

## 2024-01-11 RX ORDER — ESOMEPRAZOLE MAGNESIUM 40 MG/1
1 CAPSULE CAPSULE, DELAYED RELEASE ORAL ONCE A DAY
Qty: 90 CAPSULE | Refills: 3 | OUTPATIENT
Start: 2024-01-11

## 2024-01-11 RX ORDER — ESOMEPRAZOLE MAGNESIUM 20 MG/1
1 PACKET MIXED WITH 15 ML OF WATER CAPSULE, DELAYED RELEASE ORAL ONCE A DAY
Status: ACTIVE | COMMUNITY

## 2024-01-11 RX ORDER — RIFAXIMIN 550 MG/1
1 TABLET TABLET ORAL THREE TIMES A DAY
Qty: 42 TABLET | Refills: 2 | OUTPATIENT
Start: 2024-01-11 | End: 2024-02-22

## 2024-01-11 NOTE — HPI-TODAY'S VISIT:
Nasra is a 51-year-old female referred to me by Dr. Rob Mims for consultation of colorectal cancer screening, constipation, rectal bleeding.  She states that she has had issues with constipation and incomplete evacuation for years.  She does not take anything regularly for her bowel movements.  She does have bright red blood per rectum at times with her hemorrhoids which she states she has had since childbirth.  In December she had significant anibal bleeding.  She states that time she has a bowel movement with every urination and at times she has to strain.  She recently was straining and had developed what felt like a right lower quadrant hernia.  She has never had a colonoscopy.  Her mother has a history of colon polyps, her maternal grandfather has a history of colon cancer.  Today we have discussed risk and benefit of colonoscopy, she agrees to pursue.  She agrees to start low-dose fiber and check labs.  MB 4/26/2022 Nasra presents for follow-up of elevated liver enzymes, fatty liver, constipation and abdominal pain.  Since her last visit she started low-dose fiber.  Her bowels are more complete but she still has incomplete evacuation.  She does agree to start low-dose MiraLAX.  Her colonoscopy was normal other than 3 tubular adenomas, she will be due for repeat in 3 years.  She has been on Nexium to 20 mg capsules for years with reflux.  She is trying to wean to once daily but is usually unsuccessful after several days.  She is never had an upper endoscopy.  She denies any dysphagia.  She feels best on 40 mg of PPI daily.  Her ALT is still mildly elevated.  Her smooth muscle antibody is also weak positive.  She is due for repeat labs today with an IgG.  Her ultrasound shows fatty liver and mild  hepatomegaly.  Today we had a long discussion regarding weight loss.  She is struggled with this throughout her life.  We have discussed calorie counting, she would like to give this a try.  Today she is doing well otherwise with no new GI complaints.  She does agree to pursue EGD given her chronic reflux symptoms and inability to wean her PPI.  MB  7/20/2022 Nasra presents for follow up of fatty liver and constipation. Since her last visit she has increased her water intake substantially. She has noticed increased constipation. She is taking miralax and metamucil. She states when she changed her diet she noticed increased constipation with alternating diarrhea. She took away metamucil her stools are very sticky. When she adds in the miralax regularly she has runny stools. She has not tried just fiber itself. She has been intentional with eating whole foods and drinking lots of water. Her ALT is down to 40 from 43 in february. Her Cholesterol is significantly elevated. She is working on her diet but she suspects she will need a statin. Her EGD reveals mild reflux and gastritis. She is doing well on nexium 40mg daily. She wants to keep working on weight loss and then discus weaning at f/u visit. MB  This telehealth visit was provided at the patient's home. 1/19/2023 Nasra presents for follow-up of reflux and fatty liver.  Since her last visit she is doing well and is omeprazole 40 mg daily.  She does not want to wean at this time.  She is working on weight loss and down 8 pounds.  She is reading a book called the ENT diet and this is helping her.  She is trying to move her body more and consume less calories overall without counting.  Today she denies any new GI complaints.  MB 7/20/2023 Nasra presents for follow-up of reflux, fatty liver, and irritable bowel syndrome.  Since her last visit she has had a back injury and has not been able to work out as often.  She has gained a large amount of weight quickly almost 20 pounds.  She has had increased loose urgent bowel movements.  She denies any rectal bleeding or nocturnal symptoms.  Her right lower quadrant abdominal pain has improved.  She does have a history of fatty liver.  Today her main complaint is change in bowel habits.  Her colonoscopy in 2022 reveals 3 TA's.  We will check labs and schedule for stool studies.  If this is negative proceed with a course of Xifaxan for IBS-D.  She does ask about semaglutide products for weight loss, I recommended she discuss that with her primary care physician.  Her reflux is stable on Nexium 40 mg daily, we will hold off on weaning given her weight gain.  She is due for repeat labs, I suspect these will be mildly elevated with weight gain.  MB 1/11/2024 Nasra presents for follow-up of reflux, fatty liver, and IBS.  She is status post Afaxin with significant treatment her symptoms.  She is now somewhat constipated on semaglutide.  Her reflux is flaring somewhat with postnasal drip and globus.  She is on esomeprazole 40 mg in the morning.  Dr. Downs had recommended discussing reflux surgery.  Today we have discussed risk and benefits.  She wants to hold off on pursuing any intervention at this time, she does want to work on weight loss, she does feel better after adding famotidine at night at his recommendation.  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  Otherwise she will continue to work on weight loss.  MB

## 2024-01-12 ENCOUNTER — TELEPHONE ENCOUNTER (OUTPATIENT)
Dept: URBAN - NONMETROPOLITAN AREA CLINIC 13 | Facility: CLINIC | Age: 54
End: 2024-01-12

## 2024-01-12 LAB
A/G RATIO: 1.6
ALBUMIN: 3.8
ALKALINE PHOSPHATASE: 73
ALT (SGPT): 39
AST (SGOT): 34
BASO (ABSOLUTE): 0.1
BASOS: 1
BILIRUBIN, TOTAL: <0.2
BUN/CREATININE RATIO: 14
BUN: 13
CALCIUM: 9.3
CARBON DIOXIDE, TOTAL: 19
CHLORIDE: 104
CREATININE: 0.91
EGFR: 76
EOS (ABSOLUTE): 0.4
EOS: 4
GLOBULIN, TOTAL: 2.4
GLUCOSE: 79
HEMATOCRIT: 43
HEMATOLOGY COMMENTS:: (no result)
HEMOGLOBIN: 13.8
IMMATURE CELLS: (no result)
IMMATURE GRANS (ABS): 0.1
IMMATURE GRANULOCYTES: 1
LYMPHS (ABSOLUTE): 3.1
LYMPHS: 32
MCH: 28.9
MCHC: 32.1
MCV: 90
MONOCYTES(ABSOLUTE): 0.6
MONOCYTES: 6
NEUTROPHILS (ABSOLUTE): 5.4
NEUTROPHILS: 56
NRBC: (no result)
PLATELETS: 419
POTASSIUM: 4.4
PROTEIN, TOTAL: 6.2
RBC: 4.77
RDW: 13.3
SODIUM: 143
WBC: 9.5

## 2024-01-15 ENCOUNTER — TELEPHONE ENCOUNTER (OUTPATIENT)
Dept: URBAN - NONMETROPOLITAN AREA CLINIC 13 | Facility: CLINIC | Age: 54
End: 2024-01-15

## 2024-02-15 PROBLEM — 235919008: Status: ACTIVE | Noted: 2024-02-15

## 2024-07-18 ENCOUNTER — OFFICE VISIT (OUTPATIENT)
Dept: URBAN - NONMETROPOLITAN AREA CLINIC 2 | Facility: CLINIC | Age: 54
End: 2024-07-18
Payer: COMMERCIAL

## 2024-07-18 ENCOUNTER — LAB OUTSIDE AN ENCOUNTER (OUTPATIENT)
Dept: URBAN - NONMETROPOLITAN AREA CLINIC 2 | Facility: CLINIC | Age: 54
End: 2024-07-18

## 2024-07-18 VITALS
BODY MASS INDEX: 37.56 KG/M2 | DIASTOLIC BLOOD PRESSURE: 84 MMHG | WEIGHT: 220 LBS | HEIGHT: 64 IN | TEMPERATURE: 98 F | HEART RATE: 78 BPM | SYSTOLIC BLOOD PRESSURE: 127 MMHG

## 2024-07-18 DIAGNOSIS — K21.9 GASTROESOPHAGEAL REFLUX DISEASE, UNSPECIFIED WHETHER ESOPHAGITIS PRESENT: ICD-10-CM

## 2024-07-18 DIAGNOSIS — K76.0 FATTY LIVER: ICD-10-CM

## 2024-07-18 DIAGNOSIS — K58.2 IRRITABLE BOWEL SYNDROME WITH BOTH CONSTIPATION AND DIARRHEA: ICD-10-CM

## 2024-07-18 DIAGNOSIS — R74.8 ELEVATED LIVER ENZYMES: ICD-10-CM

## 2024-07-18 PROCEDURE — 99214 OFFICE O/P EST MOD 30 MIN: CPT | Performed by: NURSE PRACTITIONER

## 2024-07-18 RX ORDER — ASPIRIN 325 MG/1
1 TABLET TABLET, FILM COATED ORAL ONCE A DAY
Status: ACTIVE | COMMUNITY

## 2024-07-18 RX ORDER — SALICYLIC ACID 3 G/100G
1 TABLET SWALLOW WHOLE WITH WATER; DO NOT TAKE WITH FRUIT JUICES LOTION TOPICAL ONCE A DAY
Status: ACTIVE | COMMUNITY

## 2024-07-18 RX ORDER — LEVOTHYROXINE SODIUM 75 UG/1
1 TABLET IN THE MORNING ON AN EMPTY STOMACH TABLET ORAL ONCE A DAY
Status: ACTIVE | COMMUNITY

## 2024-07-18 RX ORDER — ESOMEPRAZOLE MAGNESIUM 20 MG/1
1 PACKET MIXED WITH 15 ML OF WATER CAPSULE, DELAYED RELEASE ORAL ONCE A DAY
Status: ACTIVE | COMMUNITY

## 2024-07-18 RX ORDER — FAMOTIDINE 40 MG/1
1 TABLET TABLET, FILM COATED ORAL ONCE A DAY
Qty: 90 TABLET | Refills: 3 | Status: ACTIVE | COMMUNITY
Start: 2024-01-11

## 2024-07-18 RX ORDER — ESZOPICLONE 2 MG/1
1 TABLET IMMEDIATELY BEFORE BEDTIME TABLET, COATED ORAL ONCE A DAY
Status: ACTIVE | COMMUNITY

## 2024-07-18 RX ORDER — ESOMEPRAZOLE MAGNESIUM 40 MG/1
1 CAPSULE CAPSULE, DELAYED RELEASE ORAL ONCE A DAY
Qty: 90 CAPSULE | Refills: 3 | Status: ACTIVE | COMMUNITY
Start: 2024-01-11

## 2024-07-18 RX ORDER — SERTRALINE HYDROCHLORIDE 25 MG/1
1 TABLET TABLET, FILM COATED ORAL ONCE A DAY
Status: ACTIVE | COMMUNITY

## 2024-07-18 NOTE — HPI-TODAY'S VISIT:
Nasra is a 51-year-old female referred to me by Dr. Rob Mims for consultation of colorectal cancer screening, constipation, rectal bleeding.  She states that she has had issues with constipation and incomplete evacuation for years.  She does not take anything regularly for her bowel movements.  She does have bright red blood per rectum at times with her hemorrhoids which she states she has had since childbirth.  In December she had significant anibal bleeding.  She states that time she has a bowel movement with every urination and at times she has to strain.  She recently was straining and had developed what felt like a right lower quadrant hernia.  She has never had a colonoscopy.  Her mother has a history of colon polyps, her maternal grandfather has a history of colon cancer.  Today we have discussed risk and benefit of colonoscopy, she agrees to pursue.  She agrees to start low-dose fiber and check labs.  MB 4/26/2022 Nasra presents for follow-up of elevated liver enzymes, fatty liver, constipation and abdominal pain.  Since her last visit she started low-dose fiber.  Her bowels are more complete but she still has incomplete evacuation.  She does agree to start low-dose MiraLAX.  Her colonoscopy was normal other than 3 tubular adenomas, she will be due for repeat in 3 years.  She has been on Nexium to 20 mg capsules for years with reflux.  She is trying to wean to once daily but is usually unsuccessful after several days.  She is never had an upper endoscopy.  She denies any dysphagia.  She feels best on 40 mg of PPI daily.  Her ALT is still mildly elevated.  Her smooth muscle antibody is also weak positive.  She is due for repeat labs today with an IgG.  Her ultrasound shows fatty liver and mild  hepatomegaly.  Today we had a long discussion regarding weight loss.  She is struggled with this throughout her life.  We have discussed calorie counting, she would like to give this a try.  Today she is doing well otherwise with no new GI complaints.  She does agree to pursue EGD given her chronic reflux symptoms and inability to wean her PPI.  MB  7/20/2022 Nasra presents for follow up of fatty liver and constipation. Since her last visit she has increased her water intake substantially. She has noticed increased constipation. She is taking miralax and metamucil. She states when she changed her diet she noticed increased constipation with alternating diarrhea. She took away metamucil her stools are very sticky. When she adds in the miralax regularly she has runny stools. She has not tried just fiber itself. She has been intentional with eating whole foods and drinking lots of water. Her ALT is down to 40 from 43 in february. Her Cholesterol is significantly elevated. She is working on her diet but she suspects she will need a statin. Her EGD reveals mild reflux and gastritis. She is doing well on nexium 40mg daily. She wants to keep working on weight loss and then discus weaning at f/u visit. MB  This telehealth visit was provided at the patient's home. 1/19/2023 Nasra presents for follow-up of reflux and fatty liver.  Since her last visit she is doing well and is omeprazole 40 mg daily.  She does not want to wean at this time.  She is working on weight loss and down 8 pounds.  She is reading a book called the ENT diet and this is helping her.  She is trying to move her body more and consume less calories overall without counting.  Today she denies any new GI complaints.  MB 7/20/2023 Nasra presents for follow-up of reflux, fatty liver, and irritable bowel syndrome.  Since her last visit she has had a back injury and has not been able to work out as often.  She has gained a large amount of weight quickly almost 20 pounds.  She has had increased loose urgent bowel movements.  She denies any rectal bleeding or nocturnal symptoms.  Her right lower quadrant abdominal pain has improved.  She does have a history of fatty liver.  Today her main complaint is change in bowel habits.  Her colonoscopy in 2022 reveals 3 TA's.  We will check labs and schedule for stool studies.  If this is negative proceed with a course of Xifaxan for IBS-D.  She does ask about semaglutide products for weight loss, I recommended she discuss that with her primary care physician.  Her reflux is stable on Nexium 40 mg daily, we will hold off on weaning given her weight gain.  She is due for repeat labs, I suspect these will be mildly elevated with weight gain.  MB 1/11/2024 Nasra presents for follow-up of reflux, fatty liver, and IBS.  She is status post Afaxin with significant treatment her symptoms.  She is now somewhat constipated on semaglutide.  Her reflux is flaring somewhat with postnasal drip and globus.  She is on esomeprazole 40 mg in the morning.  Dr. Downs had recommended discussing reflux surgery.  Today we have discussed risk and benefits.  She wants to hold off on pursuing any intervention at this time, she does want to work on weight loss, she does feel better after adding famotidine at night at his recommendation.  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  Otherwise she will continue to work on weight loss.  MB 7/18/2024 Nasra presents for follow-up of reflux IBS and fatty liver.  Since her last visit she cut her foot with a knife and is in a cast.  Her reflux and GI symptoms have actually been fairly well-controlled.  She is on esomeprazole 40 mg daily.  Her bowels are fairly regular.  Today we have discussed her fatty liver, she does have hepatomegaly, she has been working on healthy weight loss but has been struggling.  Today she would be a candidate for Rezdiffra if her elastography meets criteria.  MB

## 2024-07-19 ENCOUNTER — TELEPHONE ENCOUNTER (OUTPATIENT)
Dept: URBAN - NONMETROPOLITAN AREA CLINIC 2 | Facility: CLINIC | Age: 54
End: 2024-07-19

## 2024-07-19 LAB
A/G RATIO: 1.3
ABSOLUTE BASOPHILS: 84
ABSOLUTE EOSINOPHILS: 378
ABSOLUTE LYMPHOCYTES: 2780
ABSOLUTE MONOCYTES: 496
ABSOLUTE NEUTROPHILS: 4662
ALBUMIN: 3.4
ALKALINE PHOSPHATASE: 63
ALT (SGPT): 34
AST (SGOT): 29
BASOPHILS: 1
BILIRUBIN, TOTAL: 0.3
BUN/CREATININE RATIO: (no result)
BUN: 13
CALCIUM: 8.9
CARBON DIOXIDE, TOTAL: 27
CHLORIDE: 105
CREATININE: 0.96
EGFR: 71
EOSINOPHILS: 4.5
GLOBULIN, TOTAL: 2.7
GLUCOSE: 96
HEMATOCRIT: 42.5
HEMOGLOBIN: 13.7
LYMPHOCYTES: 33.1
MCH: 28.5
MCHC: 32.2
MCV: 88.4
MONOCYTES: 5.9
MPV: 9.5
NEUTROPHILS: 55.5
PLATELET COUNT: 396
POTASSIUM: 4.2
PROTEIN, TOTAL: 6.1
RDW: 12.9
RED BLOOD CELL COUNT: 4.81
SODIUM: 140
WHITE BLOOD CELL COUNT: 8.4

## 2024-08-07 ENCOUNTER — WEB ENCOUNTER (OUTPATIENT)
Dept: URBAN - NONMETROPOLITAN AREA CLINIC 2 | Facility: CLINIC | Age: 54
End: 2024-08-07

## 2024-10-09 ENCOUNTER — WEB ENCOUNTER (OUTPATIENT)
Dept: URBAN - NONMETROPOLITAN AREA CLINIC 2 | Facility: CLINIC | Age: 54
End: 2024-10-09

## 2024-10-09 RX ORDER — RIFAXIMIN 550 MG/1
1 TABLET TABLET ORAL THREE TIMES A DAY
Qty: 42 TABLET | Refills: 0 | OUTPATIENT
Start: 2024-10-14 | End: 2024-10-28

## 2024-10-16 ENCOUNTER — TELEPHONE ENCOUNTER (OUTPATIENT)
Dept: URBAN - NONMETROPOLITAN AREA CLINIC 13 | Facility: CLINIC | Age: 54
End: 2024-10-16

## 2024-10-17 ENCOUNTER — TELEPHONE ENCOUNTER (OUTPATIENT)
Dept: URBAN - NONMETROPOLITAN AREA CLINIC 13 | Facility: CLINIC | Age: 54
End: 2024-10-17

## 2024-11-04 ENCOUNTER — WEB ENCOUNTER (OUTPATIENT)
Dept: URBAN - NONMETROPOLITAN AREA CLINIC 2 | Facility: CLINIC | Age: 54
End: 2024-11-04

## 2024-11-04 RX ORDER — ESOMEPRAZOLE MAGNESIUM 40 MG/1
1 CAPSULE CAPSULE, DELAYED RELEASE ORAL ONCE A DAY
Qty: 90 CAPSULE | Refills: 3
Start: 2024-01-11

## 2024-11-21 ENCOUNTER — OFFICE VISIT (OUTPATIENT)
Dept: URBAN - NONMETROPOLITAN AREA CLINIC 2 | Facility: CLINIC | Age: 54
End: 2024-11-21

## 2025-02-06 ENCOUNTER — ERX REFILL RESPONSE (OUTPATIENT)
Dept: URBAN - NONMETROPOLITAN AREA CLINIC 2 | Facility: CLINIC | Age: 55
End: 2025-02-06

## 2025-02-06 RX ORDER — FAMOTIDINE 40 MG/1
1 TABLET TABLET, FILM COATED ORAL ONCE A DAY
Qty: 90 TABLET | Refills: 3 | OUTPATIENT

## 2025-03-12 ENCOUNTER — OFFICE VISIT (OUTPATIENT)
Dept: URBAN - NONMETROPOLITAN AREA CLINIC 13 | Facility: CLINIC | Age: 55
End: 2025-03-12
Payer: COMMERCIAL

## 2025-03-12 VITALS
SYSTOLIC BLOOD PRESSURE: 121 MMHG | HEART RATE: 94 BPM | DIASTOLIC BLOOD PRESSURE: 82 MMHG | WEIGHT: 228 LBS | HEIGHT: 64 IN | BODY MASS INDEX: 38.93 KG/M2

## 2025-03-12 DIAGNOSIS — Z80.0 FAMILY HISTORY OF COLON CANCER: ICD-10-CM

## 2025-03-12 DIAGNOSIS — K80.20 GALLSTONES: ICD-10-CM

## 2025-03-12 DIAGNOSIS — K75.81 METABOLIC DYSFUNCTION-ASSOCIATED STEATOHEPATITIS (MASH): ICD-10-CM

## 2025-03-12 DIAGNOSIS — K21.9 GASTROESOPHAGEAL REFLUX DISEASE, UNSPECIFIED WHETHER ESOPHAGITIS PRESENT: ICD-10-CM

## 2025-03-12 DIAGNOSIS — K62.5 RECTAL BLEEDING: ICD-10-CM

## 2025-03-12 DIAGNOSIS — Z83.710 FAMILY HISTORY OF ADENOMATOUS POLYP OF COLON: ICD-10-CM

## 2025-03-12 DIAGNOSIS — Z86.0101 PERSONAL HISTORY OF ADENOMATOUS AND SERRATED COLON POLYPS: ICD-10-CM

## 2025-03-12 DIAGNOSIS — K58.2 IRRITABLE BOWEL SYNDROME WITH BOTH CONSTIPATION AND DIARRHEA: ICD-10-CM

## 2025-03-12 PROBLEM — 71691000112100: Status: ACTIVE | Noted: 2025-03-12

## 2025-03-12 PROBLEM — 428283002: Status: ACTIVE | Noted: 2025-03-12

## 2025-03-12 PROBLEM — 442685003: Status: ACTIVE | Noted: 2025-03-12

## 2025-03-12 PROCEDURE — 99214 OFFICE O/P EST MOD 30 MIN: CPT | Performed by: NURSE PRACTITIONER

## 2025-03-12 RX ORDER — ESZOPICLONE 2 MG/1
1 TABLET IMMEDIATELY BEFORE BEDTIME TABLET, COATED ORAL ONCE A DAY
Status: ACTIVE | COMMUNITY

## 2025-03-12 RX ORDER — ESOMEPRAZOLE MAGNESIUM 40 MG/1
1 CAPSULE CAPSULE, DELAYED RELEASE ORAL ONCE A DAY
Qty: 90 CAPSULE | Refills: 3 | Status: ACTIVE | COMMUNITY
Start: 2024-01-11

## 2025-03-12 RX ORDER — LEVOTHYROXINE SODIUM 75 UG/1
1 TABLET IN THE MORNING ON AN EMPTY STOMACH TABLET ORAL ONCE A DAY
Status: ACTIVE | COMMUNITY

## 2025-03-12 RX ORDER — FAMOTIDINE 40 MG/1
1 TABLET TABLET, FILM COATED ORAL ONCE A DAY
Qty: 90 TABLET | Refills: 3 | Status: ACTIVE | COMMUNITY

## 2025-03-12 RX ORDER — SALICYLIC ACID 3 G/100G
1 TABLET SWALLOW WHOLE WITH WATER; DO NOT TAKE WITH FRUIT JUICES LOTION TOPICAL ONCE A DAY
Status: ACTIVE | COMMUNITY

## 2025-03-12 RX ORDER — ESOMEPRAZOLE MAGNESIUM 20 MG/1
1 PACKET MIXED WITH 15 ML OF WATER CAPSULE, DELAYED RELEASE ORAL ONCE A DAY
Status: ACTIVE | COMMUNITY

## 2025-03-12 RX ORDER — SERTRALINE HYDROCHLORIDE 25 MG/1
1 TABLET TABLET, FILM COATED ORAL ONCE A DAY
Status: ACTIVE | COMMUNITY

## 2025-03-12 RX ORDER — ASPIRIN 325 MG/1
1 TABLET TABLET, FILM COATED ORAL ONCE A DAY
Status: ACTIVE | COMMUNITY

## 2025-03-12 NOTE — HPI-TODAY'S VISIT:
Nasra is a 51-year-old female referred to me by Dr. Rob Mims for consultation of colorectal cancer screening, constipation, rectal bleeding.  She states that she has had issues with constipation and incomplete evacuation for years.  She does not take anything regularly for her bowel movements.  She does have bright red blood per rectum at times with her hemorrhoids which she states she has had since childbirth.  In December she had significant anibal bleeding.  She states that time she has a bowel movement with every urination and at times she has to strain.  She recently was straining and had developed what felt like a right lower quadrant hernia.  She has never had a colonoscopy.  Her mother has a history of colon polyps, her maternal grandfather has a history of colon cancer.  Today we have discussed risk and benefit of colonoscopy, she agrees to pursue.  She agrees to start low-dose fiber and check labs.  MB 4/26/2022 Nasra presents for follow-up of elevated liver enzymes, fatty liver, constipation and abdominal pain.  Since her last visit she started low-dose fiber.  Her bowels are more complete but she still has incomplete evacuation.  She does agree to start low-dose MiraLAX.  Her colonoscopy was normal other than 3 tubular adenomas, she will be due for repeat in 3 years.  She has been on Nexium to 20 mg capsules for years with reflux.  She is trying to wean to once daily but is usually unsuccessful after several days.  She is never had an upper endoscopy.  She denies any dysphagia.  She feels best on 40 mg of PPI daily.  Her ALT is still mildly elevated.  Her smooth muscle antibody is also weak positive.  She is due for repeat labs today with an IgG.  Her ultrasound shows fatty liver and mild  hepatomegaly.  Today we had a long discussion regarding weight loss.  She is struggled with this throughout her life.  We have discussed calorie counting, she would like to give this a try.  Today she is doing well otherwise with no new GI complaints.  She does agree to pursue EGD given her chronic reflux symptoms and inability to wean her PPI.  MB  7/20/2022 Nasra presents for follow up of fatty liver and constipation. Since her last visit she has increased her water intake substantially. She has noticed increased constipation. She is taking miralax and metamucil. She states when she changed her diet she noticed increased constipation with alternating diarrhea. She took away metamucil her stools are very sticky. When she adds in the miralax regularly she has runny stools. She has not tried just fiber itself. She has been intentional with eating whole foods and drinking lots of water. Her ALT is down to 40 from 43 in february. Her Cholesterol is significantly elevated. She is working on her diet but she suspects she will need a statin. Her EGD reveals mild reflux and gastritis. She is doing well on nexium 40mg daily. She wants to keep working on weight loss and then discus weaning at f/u visit. MB  This telehealth visit was provided at the patient's home. 1/19/2023 Nasra presents for follow-up of reflux and fatty liver.  Since her last visit she is doing well and is omeprazole 40 mg daily.  She does not want to wean at this time.  She is working on weight loss and down 8 pounds.  She is reading a book called the ENT diet and this is helping her.  She is trying to move her body more and consume less calories overall without counting.  Today she denies any new GI complaints.  MB 7/20/2023 Nasra presents for follow-up of reflux, fatty liver, and irritable bowel syndrome.  Since her last visit she has had a back injury and has not been able to work out as often.  She has gained a large amount of weight quickly almost 20 pounds.  She has had increased loose urgent bowel movements.  She denies any rectal bleeding or nocturnal symptoms.  Her right lower quadrant abdominal pain has improved.  She does have a history of fatty liver.  Today her main complaint is change in bowel habits.  Her colonoscopy in 2022 reveals 3 TA's.  We will check labs and schedule for stool studies.  If this is negative proceed with a course of Xifaxan for IBS-D.  She does ask about semaglutide products for weight loss, I recommended she discuss that with her primary care physician.  Her reflux is stable on Nexium 40 mg daily, we will hold off on weaning given her weight gain.  She is due for repeat labs, I suspect these will be mildly elevated with weight gain.  MB 1/11/2024 Nasra presents for follow-up of reflux, fatty liver, and IBS.  She is status post Afaxin with significant treatment her symptoms.  She is now somewhat constipated on semaglutide.  Her reflux is flaring somewhat with postnasal drip and globus.  She is on esomeprazole 40 mg in the morning.  Dr. Downs had recommended discussing reflux surgery.  Today we have discussed risk and benefits.  She wants to hold off on pursuing any intervention at this time, she does want to work on weight loss, she does feel better after adding famotidine at night at his recommendation.  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  Otherwise she will continue to work on weight loss.  MB 7/18/2024 Nasra presents for follow-up of reflux IBS and fatty liver.  Since her last visit she cut her foot with a knife and is in a cast.  Her reflux and GI symptoms have actually been fairly well-controlled.  She is on esomeprazole 40 mg daily.  Her bowels are fairly regular.  Today we have discussed her fatty liver, she does have hepatomegaly, she has been working on healthy weight loss but has been struggling.  Today she would be a candidate for Rezdiffra if her elastography meets criteria.  MB 3/12/2024 Nasra presents for follow-up.  Since her last visit she has been having increased incomplete evacuation.  She has increased her fiber and has had more cramping and bloating.  She did take a course of Xifaxan about 6 weeks ago with no significant change.  She does have gallstones but has no significant postprandial pain in her upper abdomen.  She continues to struggle with her weight but continues to work on healthy weight loss.  Today she is doing well otherwise, she is taking her as omeprazole 40 in the morning and famotidine at night.  She agrees to add low-dose MiraLAX to her psyllium to improve her stool consistency.  MB

## 2025-04-22 ENCOUNTER — WEB ENCOUNTER (OUTPATIENT)
Dept: URBAN - NONMETROPOLITAN AREA CLINIC 13 | Facility: CLINIC | Age: 55
End: 2025-04-22

## 2025-04-23 ENCOUNTER — WEB ENCOUNTER (OUTPATIENT)
Dept: URBAN - NONMETROPOLITAN AREA CLINIC 13 | Facility: CLINIC | Age: 55
End: 2025-04-23

## 2025-04-23 PROBLEM — 707724006: Status: ACTIVE | Noted: 2025-04-23

## 2025-04-24 ENCOUNTER — WEB ENCOUNTER (OUTPATIENT)
Dept: URBAN - NONMETROPOLITAN AREA CLINIC 13 | Facility: CLINIC | Age: 55
End: 2025-04-24

## 2025-04-30 ENCOUNTER — LAB OUTSIDE AN ENCOUNTER (OUTPATIENT)
Dept: URBAN - NONMETROPOLITAN AREA CLINIC 13 | Facility: CLINIC | Age: 55
End: 2025-04-30

## 2025-05-01 ENCOUNTER — TELEPHONE ENCOUNTER (OUTPATIENT)
Dept: URBAN - NONMETROPOLITAN AREA CLINIC 2 | Facility: CLINIC | Age: 55
End: 2025-05-01

## 2025-05-01 ENCOUNTER — WEB ENCOUNTER (OUTPATIENT)
Dept: URBAN - NONMETROPOLITAN AREA CLINIC 2 | Facility: CLINIC | Age: 55
End: 2025-05-01

## 2025-06-12 ENCOUNTER — LAB OUTSIDE AN ENCOUNTER (OUTPATIENT)
Dept: URBAN - NONMETROPOLITAN AREA CLINIC 2 | Facility: CLINIC | Age: 55
End: 2025-06-12

## 2025-06-12 ENCOUNTER — OFFICE VISIT (OUTPATIENT)
Dept: URBAN - NONMETROPOLITAN AREA CLINIC 2 | Facility: CLINIC | Age: 55
End: 2025-06-12
Payer: COMMERCIAL

## 2025-06-12 DIAGNOSIS — K80.20 GALLSTONES: ICD-10-CM

## 2025-06-12 DIAGNOSIS — Z86.0101 PERSONAL HISTORY OF ADENOMATOUS AND SERRATED COLON POLYPS: ICD-10-CM

## 2025-06-12 DIAGNOSIS — K75.81 METABOLIC DYSFUNCTION-ASSOCIATED STEATOHEPATITIS (MASH): ICD-10-CM

## 2025-06-12 DIAGNOSIS — Z83.710 FAMILY HISTORY OF ADENOMATOUS POLYP OF COLON: ICD-10-CM

## 2025-06-12 DIAGNOSIS — Z80.0 FAMILY HISTORY OF COLON CANCER: ICD-10-CM

## 2025-06-12 DIAGNOSIS — K58.2 IRRITABLE BOWEL SYNDROME WITH BOTH CONSTIPATION AND DIARRHEA: ICD-10-CM

## 2025-06-12 DIAGNOSIS — K21.9 GASTROESOPHAGEAL REFLUX DISEASE, UNSPECIFIED WHETHER ESOPHAGITIS PRESENT: ICD-10-CM

## 2025-06-12 DIAGNOSIS — R74.8 ELEVATED LIVER ENZYMES: ICD-10-CM

## 2025-06-12 DIAGNOSIS — K62.5 RECTAL BLEEDING: ICD-10-CM

## 2025-06-12 PROCEDURE — 99214 OFFICE O/P EST MOD 30 MIN: CPT | Performed by: NURSE PRACTITIONER

## 2025-06-12 RX ORDER — LINACLOTIDE 72 UG/1
1 CAPSULE AT LEAST 30 MINUTES BEFORE THE FIRST MEAL OF THE DAY ON AN EMPTY STOMACH CAPSULE, GELATIN COATED ORAL ONCE A DAY
Qty: 30 | OUTPATIENT
Start: 2025-06-12 | End: 2025-07-12

## 2025-06-12 RX ORDER — ASPIRIN 325 MG/1
1 TABLET TABLET, FILM COATED ORAL ONCE A DAY
Status: ACTIVE | COMMUNITY

## 2025-06-12 RX ORDER — ESOMEPRAZOLE MAGNESIUM 40 MG/1
1 CAPSULE CAPSULE, DELAYED RELEASE ORAL ONCE A DAY
Qty: 90 CAPSULE | Refills: 3 | Status: ACTIVE | COMMUNITY
Start: 2024-01-11

## 2025-06-12 RX ORDER — FAMOTIDINE 40 MG/1
1 TABLET TABLET, FILM COATED ORAL ONCE A DAY
Qty: 90 TABLET | Refills: 3 | Status: ACTIVE | COMMUNITY

## 2025-06-12 RX ORDER — ESOMEPRAZOLE MAGNESIUM 20 MG/1
1 PACKET MIXED WITH 15 ML OF WATER CAPSULE, DELAYED RELEASE ORAL ONCE A DAY
Status: ACTIVE | COMMUNITY

## 2025-06-12 RX ORDER — SERTRALINE HYDROCHLORIDE 25 MG/1
1 TABLET TABLET, FILM COATED ORAL ONCE A DAY
Status: ACTIVE | COMMUNITY

## 2025-06-12 RX ORDER — ESZOPICLONE 2 MG/1
1 TABLET IMMEDIATELY BEFORE BEDTIME TABLET, COATED ORAL ONCE A DAY
Status: ACTIVE | COMMUNITY

## 2025-06-12 RX ORDER — LEVOTHYROXINE SODIUM 75 UG/1
1 TABLET IN THE MORNING ON AN EMPTY STOMACH TABLET ORAL ONCE A DAY
Status: ACTIVE | COMMUNITY

## 2025-06-12 RX ORDER — SALICYLIC ACID 3 G/100G
1 TABLET SWALLOW WHOLE WITH WATER; DO NOT TAKE WITH FRUIT JUICES LOTION TOPICAL ONCE A DAY
Status: ACTIVE | COMMUNITY

## 2025-06-12 NOTE — HPI-TODAY'S VISIT:
Nasra is a 51-year-old female referred to me by Dr. Rob Mims for consultation of colorectal cancer screening, constipation, rectal bleeding.  She states that she has had issues with constipation and incomplete evacuation for years.  She does not take anything regularly for her bowel movements.  She does have bright red blood per rectum at times with her hemorrhoids which she states she has had since childbirth.  In December she had significant anibal bleeding.  She states that time she has a bowel movement with every urination and at times she has to strain.  She recently was straining and had developed what felt like a right lower quadrant hernia.  She has never had a colonoscopy.  Her mother has a history of colon polyps, her maternal grandfather has a history of colon cancer.  Today we have discussed risk and benefit of colonoscopy, she agrees to pursue.  She agrees to start low-dose fiber and check labs.  MB 4/26/2022 Nasra presents for follow-up of elevated liver enzymes, fatty liver, constipation and abdominal pain.  Since her last visit she started low-dose fiber.  Her bowels are more complete but she still has incomplete evacuation.  She does agree to start low-dose MiraLAX.  Her colonoscopy was normal other than 3 tubular adenomas, she will be due for repeat in 3 years.  She has been on Nexium to 20 mg capsules for years with reflux.  She is trying to wean to once daily but is usually unsuccessful after several days.  She is never had an upper endoscopy.  She denies any dysphagia.  She feels best on 40 mg of PPI daily.  Her ALT is still mildly elevated.  Her smooth muscle antibody is also weak positive.  She is due for repeat labs today with an IgG.  Her ultrasound shows fatty liver and mild  hepatomegaly.  Today we had a long discussion regarding weight loss.  She is struggled with this throughout her life.  We have discussed calorie counting, she would like to give this a try.  Today she is doing well otherwise with no new GI complaints.  She does agree to pursue EGD given her chronic reflux symptoms and inability to wean her PPI.  MB  7/20/2022 Nasra presents for follow up of fatty liver and constipation. Since her last visit she has increased her water intake substantially. She has noticed increased constipation. She is taking miralax and metamucil. She states when she changed her diet she noticed increased constipation with alternating diarrhea. She took away metamucil her stools are very sticky. When she adds in the miralax regularly she has runny stools. She has not tried just fiber itself. She has been intentional with eating whole foods and drinking lots of water. Her ALT is down to 40 from 43 in february. Her Cholesterol is significantly elevated. She is working on her diet but she suspects she will need a statin. Her EGD reveals mild reflux and gastritis. She is doing well on nexium 40mg daily. She wants to keep working on weight loss and then discus weaning at f/u visit. MB  This telehealth visit was provided at the patient's home. 1/19/2023 Nasra presents for follow-up of reflux and fatty liver.  Since her last visit she is doing well and is omeprazole 40 mg daily.  She does not want to wean at this time.  She is working on weight loss and down 8 pounds.  She is reading a book called the ENT diet and this is helping her.  She is trying to move her body more and consume less calories overall without counting.  Today she denies any new GI complaints.  MB 7/20/2023 Nasra presents for follow-up of reflux, fatty liver, and irritable bowel syndrome.  Since her last visit she has had a back injury and has not been able to work out as often.  She has gained a large amount of weight quickly almost 20 pounds.  She has had increased loose urgent bowel movements.  She denies any rectal bleeding or nocturnal symptoms.  Her right lower quadrant abdominal pain has improved.  She does have a history of fatty liver.  Today her main complaint is change in bowel habits.  Her colonoscopy in 2022 reveals 3 TA's.  We will check labs and schedule for stool studies.  If this is negative proceed with a course of Xifaxan for IBS-D.  She does ask about semaglutide products for weight loss, I recommended she discuss that with her primary care physician.  Her reflux is stable on Nexium 40 mg daily, we will hold off on weaning given her weight gain.  She is due for repeat labs, I suspect these will be mildly elevated with weight gain.  MB 1/11/2024 Nasra presents for follow-up of reflux, fatty liver, and IBS.  She is status post Afaxin with significant treatment her symptoms.  She is now somewhat constipated on semaglutide.  Her reflux is flaring somewhat with postnasal drip and globus.  She is on esomeprazole 40 mg in the morning.  Dr. Downs had recommended discussing reflux surgery.  Today we have discussed risk and benefits.  She wants to hold off on pursuing any intervention at this time, she does want to work on weight loss, she does feel better after adding famotidine at night at his recommendation.  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  Otherwise she will continue to work on weight loss.  MB 7/18/2024 Nasra presents for follow-up of reflux IBS and fatty liver.  Since her last visit she cut her foot with a knife and is in a cast.  Her reflux and GI symptoms have actually been fairly well-controlled.  She is on esomeprazole 40 mg daily.  Her bowels are fairly regular.  Today we have discussed her fatty liver, she does have hepatomegaly, she has been working on healthy weight loss but has been struggling.  Today she would be a candidate for Rezdiffra if her elastography meets criteria.  MB 3/12/2024 Nasra presents for follow-up.  Since her last visit she has been having increased incomplete evacuation.  She has increased her fiber and has had more cramping and bloating.  She did take a course of Xifaxan about 6 weeks ago with no significant change.  She does have gallstones but has no significant postprandial pain in her upper abdomen.  She continues to struggle with her weight but continues to work on healthy weight loss.  Today she is doing well otherwise, she is taking her as omeprazole 40 in the morning and famotidine at night.  She agrees to add low-dose MiraLAX to her psyllium to improve her stool consistency.  MB 6/12/2025 Nasra presents for follow-up of elevated liver enzymes.  Since her last visit she saw Dr. Coronado and had routine lab work with an elevated ALT in the 400s and AST alkaline phosphatase with mild elevation.  A week prior to seeing him she did have an episode of what she described as gas pain in her back and some darker colored urine.  She denies any overt nausea vomiting or severe abdominal pain currently.  She does have a history of gallstones last imaged on ultrasound in 2024.  She also has increased constipation.  If she takes her psyllium slowly she reports incomplete evacuation, if she even takes a fourth of a capful of MiraLAX she reports smearing and loose stools.  She continues to have lower abdominal cramping intermittently.  Her last colonoscopy was in 2022 with 3 polyps.  Today she has multiple GI complaints.  We will repeat her labs along with an IgG as her NORMA was mildly elevated, we will also repeat imaging.  She could have passed a small gallstone.  In regard to her bowel pattern I am going to add Linzess 72 mcg daily for IBS-C to improve evacuation and cramping.  Will schedule repeat colonoscopy as she is very concerned about her family history of colon cancer and her personal history of colon polyps with persistent symptoms despite multiple adjustments in MiraLAX and fiber.  We will follow-up pending her workup.  MB

## 2025-06-13 LAB
A/G RATIO: 1.3
ABSOLUTE BASOPHILS: 86
ABSOLUTE EOSINOPHILS: 385
ABSOLUTE LYMPHOCYTES: 4077
ABSOLUTE MONOCYTES: 535
ABSOLUTE NEUTROPHILS: 5618
ALBUMIN: 3.5
ALKALINE PHOSPHATASE: 89
ALT (SGPT): 45
AST (SGOT): 39
BASOPHILS: 0.8
BILIRUBIN, TOTAL: 0.3
BUN/CREATININE RATIO: (no result)
BUN: 19
CALCIUM: 8.9
CARBON DIOXIDE, TOTAL: 25
CHLORIDE: 104
CREATININE: 0.97
EGFR: 70
EOSINOPHILS: 3.6
GLOBULIN, TOTAL: 2.8
GLUCOSE: 114
HEMATOCRIT: 42.7
HEMOGLOBIN: 13.5
IMMUNOGLOBULIN G, QN, SERUM: 1076
LYMPHOCYTES: 38.1
MCH: 27.7
MCHC: 31.6
MCV: 87.5
MONOCYTES: 5
MPV: 9.4
NEUTROPHILS: 52.5
PLATELET COUNT: 415
POTASSIUM: 4.7
PROTEIN, TOTAL: 6.3
RDW: 14
RED BLOOD CELL COUNT: 4.88
SODIUM: 139
WHITE BLOOD CELL COUNT: 10.7

## 2025-06-18 ENCOUNTER — WEB ENCOUNTER (OUTPATIENT)
Dept: URBAN - NONMETROPOLITAN AREA CLINIC 2 | Facility: CLINIC | Age: 55
End: 2025-06-18

## 2025-07-03 ENCOUNTER — TELEPHONE ENCOUNTER (OUTPATIENT)
Dept: URBAN - NONMETROPOLITAN AREA CLINIC 2 | Facility: CLINIC | Age: 55
End: 2025-07-03

## 2025-08-19 ENCOUNTER — P2P PATIENT RECORD (OUTPATIENT)
Age: 55
End: 2025-08-19

## 2025-08-19 ENCOUNTER — TELEPHONE ENCOUNTER (OUTPATIENT)
Dept: URBAN - NONMETROPOLITAN AREA CLINIC 2 | Facility: CLINIC | Age: 55
End: 2025-08-19

## 2025-08-19 ENCOUNTER — OFFICE VISIT (OUTPATIENT)
Dept: URBAN - NONMETROPOLITAN AREA CLINIC 1 | Facility: CLINIC | Age: 55
End: 2025-08-19
Payer: COMMERCIAL

## 2025-08-19 DIAGNOSIS — K76.0: ICD-10-CM

## 2025-08-19 DIAGNOSIS — K74.01 EARLY HEPATIC FIBROSIS: ICD-10-CM

## 2025-08-19 PROCEDURE — 76981 USE PARENCHYMA: CPT | Performed by: INTERNAL MEDICINE

## 2025-08-20 ENCOUNTER — CLAIMS CREATED FROM THE CLAIM WINDOW (OUTPATIENT)
Dept: URBAN - METROPOLITAN AREA CLINIC 117 | Facility: CLINIC | Age: 55
End: 2025-08-20
Payer: COMMERCIAL

## 2025-08-20 DIAGNOSIS — K74.01 EARLY HEPATIC FIBROSIS: ICD-10-CM

## 2025-08-20 DIAGNOSIS — K76.0: ICD-10-CM

## 2025-08-20 PROCEDURE — 76981 USE PARENCHYMA: CPT | Performed by: INTERNAL MEDICINE

## 2025-08-22 ENCOUNTER — TELEPHONE ENCOUNTER (OUTPATIENT)
Dept: URBAN - NONMETROPOLITAN AREA CLINIC 2 | Facility: CLINIC | Age: 55
End: 2025-08-22